# Patient Record
Sex: MALE | Race: WHITE
[De-identification: names, ages, dates, MRNs, and addresses within clinical notes are randomized per-mention and may not be internally consistent; named-entity substitution may affect disease eponyms.]

---

## 2018-02-17 ENCOUNTER — HOSPITAL ENCOUNTER (EMERGENCY)
Dept: HOSPITAL 42 - ED | Age: 28
Discharge: LEFT BEFORE DISCHARGE | End: 2018-02-17
Payer: COMMERCIAL

## 2018-02-17 VITALS
TEMPERATURE: 98.6 F | DIASTOLIC BLOOD PRESSURE: 86 MMHG | OXYGEN SATURATION: 99 % | RESPIRATION RATE: 17 BRPM | HEART RATE: 86 BPM | SYSTOLIC BLOOD PRESSURE: 139 MMHG

## 2018-02-17 DIAGNOSIS — M25.562: ICD-10-CM

## 2018-02-17 DIAGNOSIS — S16.1XXA: Primary | ICD-10-CM
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
## 2018-02-17 DIAGNOSIS — F17.210: ICD-10-CM
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
## 2018-02-18 ENCOUNTER — HOSPITAL ENCOUNTER (EMERGENCY)
Dept: HOSPITAL 42 - ED | Age: 28
LOS: 1 days | Discharge: HOME | End: 2018-02-19
Payer: COMMERCIAL

## 2018-02-18 VITALS — BODY MASS INDEX: 38.5 KG/M2

## 2018-02-18 DIAGNOSIS — S86.911A: ICD-10-CM

## 2018-02-18 DIAGNOSIS — S16.1XXA: Primary | ICD-10-CM

## 2018-02-18 DIAGNOSIS — F17.210: ICD-10-CM

## 2018-02-18 DIAGNOSIS — S86.912A: ICD-10-CM
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
## 2018-02-19 VITALS
SYSTOLIC BLOOD PRESSURE: 162 MMHG | DIASTOLIC BLOOD PRESSURE: 95 MMHG | HEART RATE: 105 BPM | TEMPERATURE: 99 F | RESPIRATION RATE: 18 BRPM | OXYGEN SATURATION: 98 %
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
## 2018-02-19 NOTE — CT
EXAM:

  CT Cervical Spine Without Intravenous  Contrast



CLINICAL HISTORY:

  27 years old, male; Pain; Neck pain



TECHNIQUE:

  Axial computed tomography images of the cervical spine without intravenous 

contrast.  All CT scans at this facility use one or more dose reduction 

techniques, viz.: automated exposure control; ma/kV adjustment per patient size 

(including targeted exams where dose is matched to indication; i.e. head); or 

iterative reconstruction technique.  410 images are submitted.

  Coronal and sagittal reformatted images were created and reviewed.



COMPARISON:

  No relevant prior studies available.



FINDINGS:

  Vertebrae:  Unremarkable.  No acute fracture.

  Discs/spinal canal/neural foramina:  No acute findings.  No spinal canal 

stenosis.

  Soft tissues:  Unremarkable.

  Lymph nodes:  Bilateral cervical chain lymph nodes.  Subcentimeter submental 

and submandibular lymph nodes.

  Lung apices:  Unremarkable.



IMPRESSION:     

  There is no acute fracture of cervical spine.
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
## 2018-02-19 NOTE — CT
EXAM:

  CT Head Without Intravenous  Contrast



CLINICAL HISTORY:

  27 years old, male; Signs and symptoms; Dizziness; Additional info: Dizzy



TECHNIQUE:

  Axial computed tomography images of the head/brain without intravenous 

contrast.  All CT scans at this facility use one or more dose reduction 

techniques, viz.: automated exposure control; ma/kV adjustment per patient size 

(including targeted exams where dose is matched to indication; i.e. head); or 

iterative reconstruction technique.  370 images are submitted.

  Coronal and sagittal reformatted images were created and reviewed.

  Axial reformatted images were created and reviewed.



COMPARISON:

  No relevant prior studies available.



FINDINGS:

  Brain:  There is asymmetric left temporal lobe volume loss with prominent 

extra-axial space seen on image 9 series 2 and on image 80 series 602.  These 

findings can be secondary to congenital etiology versus trauma versus prior 

infarct.  Correlation with patient's clinical history is recommended.  There is 

prominence of sulci and cisterns for patient's age.  No hemorrhage.  No 

significant white matter disease.

  Ventricles:  Unremarkable.  No ventriculomegaly.

  Bones/joints:  Unremarkable.  No acute fracture.

  Soft tissues:  Unremarkable.

  Sinuses:  Unremarkable.  No acute sinusitis.

  Mastoid air cells:  Unremarkable.  No mastoid effusion.

  Orbits:  The globe and lens are intact.



IMPRESSION:     

  No evidence of an acute intracranial hemorrhage, midline shift or mass effect 

is identified.

## 2018-02-19 NOTE — RAD
PROCEDURE:  Radiographs of the Lumbar Spine.



HISTORY:

lumbar pain







COMPARISON:

No prior.



FINDINGS:



BONES:

Normal alignment. No listhesis. No fracture.



DISC SPACES:

Unremarkable.



OTHER FINDINGS:

None.



IMPRESSION:

Unremarkable radiographs of the lumbar spine.